# Patient Record
Sex: FEMALE | Race: WHITE
[De-identification: names, ages, dates, MRNs, and addresses within clinical notes are randomized per-mention and may not be internally consistent; named-entity substitution may affect disease eponyms.]

---

## 2018-03-13 ENCOUNTER — HOSPITAL ENCOUNTER (OUTPATIENT)
Dept: HOSPITAL 100 - HPRAD | Age: 43
End: 2018-03-13
Payer: COMMERCIAL

## 2018-03-13 DIAGNOSIS — M54.5: Primary | ICD-10-CM

## 2018-03-13 PROCEDURE — 72114 X-RAY EXAM L-S SPINE BENDING: CPT

## 2018-04-20 ENCOUNTER — HOSPITAL ENCOUNTER (OUTPATIENT)
Dept: HOSPITAL 100 - MRI | Age: 43
End: 2018-04-20
Payer: COMMERCIAL

## 2018-04-20 DIAGNOSIS — M54.16: Primary | ICD-10-CM

## 2018-04-20 PROCEDURE — 72148 MRI LUMBAR SPINE W/O DYE: CPT

## 2018-06-22 ENCOUNTER — HOSPITAL ENCOUNTER (OUTPATIENT)
Age: 43
End: 2018-06-22
Payer: COMMERCIAL

## 2018-06-22 DIAGNOSIS — M54.2: Primary | ICD-10-CM

## 2018-06-22 PROCEDURE — 72040 X-RAY EXAM NECK SPINE 2-3 VW: CPT

## 2019-05-07 ENCOUNTER — HOSPITAL ENCOUNTER (OUTPATIENT)
Age: 44
Discharge: HOME | End: 2019-05-07
Payer: COMMERCIAL

## 2019-05-07 VITALS — BODY MASS INDEX: 25.9 KG/M2

## 2019-05-07 DIAGNOSIS — F11.20: Primary | ICD-10-CM

## 2019-05-07 LAB
AMPHET UR-MCNC: NEGATIVE NG/ML
BARBITURATE URINE VISTA: NEGATIVE
BENZODIAZEPINE URINE VISTA: NEGATIVE
COCAINE URINE VISTA: NEGATIVE
DRUG CONFIRMATION TO FOLLOW?: (no result)
ECSTACY URINE VISTA: NEGATIVE
METHADONE URINE VISTA: NEGATIVE
PCP UR QL: NEGATIVE
PH UR: 7 [PH]
THC URINE VISTA: NEGATIVE

## 2019-05-07 PROCEDURE — 80307 DRUG TEST PRSMV CHEM ANLYZR: CPT

## 2020-11-27 ENCOUNTER — HOSPITAL ENCOUNTER (OUTPATIENT)
Dept: HOSPITAL 100 - LABSPEC | Age: 45
Discharge: HOME | End: 2020-11-27
Payer: COMMERCIAL

## 2020-11-27 VITALS — BODY MASS INDEX: 25.5 KG/M2

## 2020-11-27 DIAGNOSIS — Z20.828: Primary | ICD-10-CM

## 2020-11-27 PROCEDURE — C9803 HOPD COVID-19 SPEC COLLECT: HCPCS

## 2020-11-27 PROCEDURE — 87635 SARS-COV-2 COVID-19 AMP PRB: CPT

## 2020-11-27 PROCEDURE — U0003 INFECTIOUS AGENT DETECTION BY NUCLEIC ACID (DNA OR RNA); SEVERE ACUTE RESPIRATORY SYNDROME CORONAVIRUS 2 (SARS-COV-2) (CORONAVIRUS DISEASE [COVID-19]), AMPLIFIED PROBE TECHNIQUE, MAKING USE OF HIGH THROUGHPUT TECHNOLOGIES AS DESCRIBED BY CMS-2020-01-R: HCPCS

## 2021-02-18 ENCOUNTER — HOSPITAL ENCOUNTER (OUTPATIENT)
Age: 46
End: 2021-02-18
Payer: COMMERCIAL

## 2021-02-18 VITALS — BODY MASS INDEX: 25.5 KG/M2

## 2021-02-18 DIAGNOSIS — F11.20: Primary | ICD-10-CM

## 2021-02-18 LAB
AMPHET UR-MCNC: NEGATIVE NG/ML
BARBITURATE URINE VISTA: NEGATIVE
BENZODIAZEPINE URINE VISTA: NEGATIVE
COCAINE URINE VISTA: NEGATIVE
DRUG CONFIRMATION TO FOLLOW?: (no result)
ECSTACY URINE VISTA: NEGATIVE
METHADONE URINE VISTA: NEGATIVE
PCP UR QL: NEGATIVE
PH UR: 6 [PH]
THC URINE VISTA: NEGATIVE

## 2021-02-18 PROCEDURE — 80307 DRUG TEST PRSMV CHEM ANLYZR: CPT

## 2021-11-09 ENCOUNTER — HOSPITAL ENCOUNTER (OUTPATIENT)
Age: 46
End: 2021-11-09
Payer: COMMERCIAL

## 2021-11-09 DIAGNOSIS — F11.20: Primary | ICD-10-CM

## 2021-11-09 LAB
AMPHET UR-MCNC: NEGATIVE NG/ML
BARBITURATE URINE VISTA: NEGATIVE
BENZODIAZEPINE URINE VISTA: NEGATIVE
COCAINE URINE VISTA: NEGATIVE
DRUG CONFIRMATION TO FOLLOW?: (no result)
ECSTACY URINE VISTA: NEGATIVE
METHADONE URINE VISTA: NEGATIVE
PCP UR QL: NEGATIVE
PH UR: 5 [PH]
THC URINE VISTA: NEGATIVE

## 2021-11-09 PROCEDURE — 80307 DRUG TEST PRSMV CHEM ANLYZR: CPT

## 2022-09-06 ENCOUNTER — HOSPITAL ENCOUNTER (OUTPATIENT)
Dept: HOSPITAL 100 - LAB | Age: 47
Discharge: HOME | End: 2022-09-06
Payer: COMMERCIAL

## 2022-09-06 DIAGNOSIS — F11.20: Primary | ICD-10-CM

## 2022-09-06 PROCEDURE — 80307 DRUG TEST PRSMV CHEM ANLYZR: CPT

## 2023-04-25 ENCOUNTER — HOSPITAL ENCOUNTER (OUTPATIENT)
Age: 48
Discharge: HOME | End: 2023-04-25
Payer: COMMERCIAL

## 2023-04-25 DIAGNOSIS — F11.20: Primary | ICD-10-CM

## 2023-04-25 PROCEDURE — 80307 DRUG TEST PRSMV CHEM ANLYZR: CPT

## 2024-08-13 ENCOUNTER — HOSPITAL ENCOUNTER (OUTPATIENT)
Dept: HOSPITAL 100 - LAB | Age: 49
Discharge: HOME | End: 2024-08-13
Payer: COMMERCIAL

## 2024-08-13 DIAGNOSIS — F11.20: Primary | ICD-10-CM

## 2024-08-13 PROCEDURE — 80307 DRUG TEST PRSMV CHEM ANLYZR: CPT

## 2024-10-22 ENCOUNTER — OFFICE VISIT (OUTPATIENT)
Dept: URGENT CARE | Age: 49
End: 2024-10-22
Payer: COMMERCIAL

## 2024-10-22 VITALS
OXYGEN SATURATION: 99 % | TEMPERATURE: 98.1 F | HEIGHT: 68 IN | SYSTOLIC BLOOD PRESSURE: 124 MMHG | HEART RATE: 102 BPM | WEIGHT: 170 LBS | RESPIRATION RATE: 20 BRPM | BODY MASS INDEX: 25.76 KG/M2 | DIASTOLIC BLOOD PRESSURE: 77 MMHG

## 2024-10-22 DIAGNOSIS — J01.00 ACUTE NON-RECURRENT MAXILLARY SINUSITIS: ICD-10-CM

## 2024-10-22 DIAGNOSIS — J22 LOWER RESPIRATORY TRACT INFECTION: Primary | ICD-10-CM

## 2024-10-22 RX ORDER — HYDROCODONE BITARTRATE AND ACETAMINOPHEN 7.5; 3 MG/1; MG/1
1 TABLET ORAL 2 TIMES DAILY
COMMUNITY

## 2024-10-22 RX ORDER — BENZONATATE 100 MG/1
CAPSULE ORAL
Qty: 60 CAPSULE | Refills: 0 | Status: SHIPPED | OUTPATIENT
Start: 2024-10-22

## 2024-10-22 RX ORDER — DOXYCYCLINE 100 MG/1
CAPSULE ORAL
Qty: 14 CAPSULE | Refills: 0 | Status: SHIPPED | OUTPATIENT
Start: 2024-10-22

## 2024-10-22 RX ORDER — PREDNISONE 10 MG/1
TABLET ORAL
Qty: 21 TABLET | Refills: 0 | Status: SHIPPED | OUTPATIENT
Start: 2024-10-22 | End: 2024-10-27

## 2024-10-22 RX ORDER — DEXTROAMPHETAMINE SACCHARATE, AMPHETAMINE ASPARTATE, DEXTROAMPHETAMINE SULFATE AND AMPHETAMINE SULFATE 1.25; 1.25; 1.25; 1.25 MG/1; MG/1; MG/1; MG/1
1 TABLET ORAL
COMMUNITY
Start: 2024-10-17

## 2024-10-22 ASSESSMENT — ENCOUNTER SYMPTOMS
PSYCHIATRIC NEGATIVE: 1
HEMATOLOGIC/LYMPHATIC NEGATIVE: 1
MUSCULOSKELETAL NEGATIVE: 1
GASTROINTESTINAL NEGATIVE: 1
ENDOCRINE NEGATIVE: 1
CONSTITUTIONAL NEGATIVE: 1
EYES NEGATIVE: 1
ALLERGIC/IMMUNOLOGIC NEGATIVE: 1
NEUROLOGICAL NEGATIVE: 1
CARDIOVASCULAR NEGATIVE: 1
COUGH: 1

## 2024-10-22 ASSESSMENT — PAIN SCALES - GENERAL: PAINLEVEL_OUTOF10: 0-NO PAIN

## 2024-10-22 NOTE — PROGRESS NOTES
"Subjective   Patient ID: Karlene Koroma is a 48 y.o. female. They present today with a chief complaint of Cough.    History of Present Illness  Patient is a 47 y/o female c/o productive cough, chest congestion, nasal congestion/drainage x7 days. Denies fever, chills, bodyaches, lethargy, weakness, chest pain/tightness/pressure, SOB, wheezing, N/V/D, ABD pain. No OTC medication reported to be taken.           Past Medical History  Allergies as of 10/22/2024    (No Known Allergies)       (Not in a hospital admission)       History reviewed. No pertinent past medical history.    History reviewed. No pertinent surgical history.     reports that she has been smoking cigarettes. She has never used smokeless tobacco. She reports current alcohol use.    Review of Systems  Review of Systems   Constitutional: Negative.    HENT:          Nasal congestion/drainage, sinus pressure   Eyes: Negative.    Respiratory:  Positive for cough.         Chest congestion   Cardiovascular: Negative.    Gastrointestinal: Negative.    Endocrine: Negative.    Genitourinary: Negative.    Musculoskeletal: Negative.    Skin: Negative.    Allergic/Immunologic: Negative.    Neurological: Negative.    Hematological: Negative.    Psychiatric/Behavioral: Negative.                                    Objective    Vitals:    10/22/24 0905   BP: 124/77   Pulse: 102   Resp: 20   Temp: 36.7 °C (98.1 °F)   TempSrc: Oral   SpO2: 99%   Weight: 77.1 kg (170 lb)   Height: 1.727 m (5' 8\")     No LMP recorded.    Physical Exam  Constitutional:       Comments: Patient A/O x4, LOC 5, calm and cooperative. Patient self-ambulatory to treatment area and is in no acute distress.    HENT:      Head: Normocephalic and atraumatic.      Right Ear: Tympanic membrane normal.      Left Ear: Tympanic membrane normal.      Nose:      Comments: Bilateral inferior turbinates edematous and erythematous with moderate amounts of purulent drainage from nares. Bilateral maxillary " sinus pressure to palpation      Mouth/Throat:      Comments: Posterior pharynx erythematous without tonsillar enlargement or exudates. Uvula midline. No petechiae to palates.   Eyes:      Extraocular Movements: Extraocular movements intact.      Conjunctiva/sclera: Conjunctivae normal.      Pupils: Pupils are equal, round, and reactive to light.   Cardiovascular:      Rate and Rhythm: Normal rate and regular rhythm.      Pulses: Normal pulses.      Heart sounds: Normal heart sounds.   Pulmonary:      Comments: Audible infrequent, moist cough present during physical examination. All lungfields clear to roomair per auscultation. Patient speaking in complete sentences without SOB or difficulty. Patient in no acute respiratory distress   Abdominal:      General: Abdomen is flat. Bowel sounds are normal.      Palpations: Abdomen is soft.   Musculoskeletal:         General: Normal range of motion.      Cervical back: Neck supple.   Skin:     General: Skin is warm and dry.      Capillary Refill: Capillary refill takes less than 2 seconds.   Neurological:      General: No focal deficit present.      Mental Status: She is oriented to person, place, and time.   Psychiatric:         Mood and Affect: Mood normal.         Behavior: Behavior normal.         Procedures    Point of Care Test & Imaging Results from this visit  No results found for this visit on 10/22/24.   No results found.    Diagnostic study results (if any) were reviewed by KAYLENE Gunn.    Assessment/Plan   Allergies, medications, history, and pertinent labs/EKGs/Imaging reviewed by KAYLENE Gunn.     Medical Decision Making  Patient presenting with nasal congestion/drianage, sinus pressure, productive cough x1 week. At time of discharge, patient was clinically well-appearing and appropriate for outpatient management. The patient/parent/guardian was educated regarding diagnosis, supportive care, OTC and Rx medications. The  patient/parent/guardian was given the opportunity to ask questions prior to discharge. They verbalized understanding of discussion of treatment plan, expected course of illness and/or injury, indications on when to return to , when to seek further evaluation in ED/call 911, and the need to follow up with PCP and/or specialist as referred. Patient/parent/guardian was provided with work/school documentation if requested. Patient stable upon discharge.     Orders and Diagnoses  Diagnoses and all orders for this visit:  Lower respiratory tract infection  -     doxycycline (Vibramycin) 100 mg capsule; Take one capsule (100mg) PO BID x7 days. Take with food  -     benzonatate (Tessalon Perles) 100 mg capsule; Take 1-2 capsules by mouth three times daily as needed for cough. May cause drowsiness  -     predniSONE (Deltasone) 10 mg tablet; Take 6 tablets (60 mg) by mouth once daily for 1 day, THEN 5 tablets (50 mg) once daily for 1 day, THEN 4 tablets (40 mg) once daily for 1 day, THEN 3 tablets (30 mg) once daily for 1 day, THEN 2 tablets (20 mg) once daily for 1 day, THEN 1 tablet (10 mg) once daily for 1 day. Take in the mornings with food.  Acute non-recurrent maxillary sinusitis  -     doxycycline (Vibramycin) 100 mg capsule; Take one capsule (100mg) PO BID x7 days. Take with food  -     predniSONE (Deltasone) 10 mg tablet; Take 6 tablets (60 mg) by mouth once daily for 1 day, THEN 5 tablets (50 mg) once daily for 1 day, THEN 4 tablets (40 mg) once daily for 1 day, THEN 3 tablets (30 mg) once daily for 1 day, THEN 2 tablets (20 mg) once daily for 1 day, THEN 1 tablet (10 mg) once daily for 1 day. Take in the mornings with food.      Medical Admin Record      Patient disposition: Home    Electronically signed by KAYLENE Gunn  9:36 AM